# Patient Record
Sex: MALE | Race: BLACK OR AFRICAN AMERICAN | NOT HISPANIC OR LATINO | Employment: UNEMPLOYED | URBAN - NONMETROPOLITAN AREA
[De-identification: names, ages, dates, MRNs, and addresses within clinical notes are randomized per-mention and may not be internally consistent; named-entity substitution may affect disease eponyms.]

---

## 2021-07-27 ENCOUNTER — OFFICE VISIT (OUTPATIENT)
Dept: FAMILY MEDICINE | Facility: CLINIC | Age: 71
End: 2021-07-27
Payer: MEDICARE

## 2021-07-27 VITALS
HEIGHT: 71 IN | WEIGHT: 204 LBS | RESPIRATION RATE: 20 BRPM | DIASTOLIC BLOOD PRESSURE: 72 MMHG | TEMPERATURE: 98 F | BODY MASS INDEX: 28.56 KG/M2 | HEART RATE: 52 BPM | SYSTOLIC BLOOD PRESSURE: 136 MMHG | OXYGEN SATURATION: 98 %

## 2021-07-27 DIAGNOSIS — M54.50 LOW BACK PAIN WITHOUT SCIATICA, UNSPECIFIED BACK PAIN LATERALITY, UNSPECIFIED CHRONICITY: Primary | ICD-10-CM

## 2021-07-27 LAB
BILIRUB SERPL-MCNC: NEGATIVE MG/DL
BLOOD URINE, POC: NEGATIVE
COLOR, POC UA: YELLOW
GLUCOSE UR QL STRIP: NEGATIVE
KETONES UR QL STRIP: NEGATIVE
LEUKOCYTE ESTERASE URINE, POC: NEGATIVE
NITRITE, POC UA: NEGATIVE
PH, POC UA: 5
PROTEIN, POC: NEGATIVE
SPECIFIC GRAVITY, POC UA: >=1.03
UROBILINOGEN, POC UA: 0.2

## 2021-07-27 PROCEDURE — 96372 PR INJECTION,THERAP/PROPH/DIAG2ST, IM OR SUBCUT: ICD-10-PCS | Mod: ,,, | Performed by: NURSE PRACTITIONER

## 2021-07-27 PROCEDURE — 96372 THER/PROPH/DIAG INJ SC/IM: CPT | Mod: ,,, | Performed by: NURSE PRACTITIONER

## 2021-07-27 PROCEDURE — 81003 URINALYSIS AUTO W/O SCOPE: CPT | Mod: RHCUB | Performed by: NURSE PRACTITIONER

## 2021-07-27 PROCEDURE — 99203 PR OFFICE/OUTPT VISIT, NEW, LEVL III, 30-44 MIN: ICD-10-PCS | Mod: 25,,, | Performed by: NURSE PRACTITIONER

## 2021-07-27 PROCEDURE — 99203 OFFICE O/P NEW LOW 30 MIN: CPT | Mod: 25,,, | Performed by: NURSE PRACTITIONER

## 2021-07-27 RX ORDER — TIZANIDINE 4 MG/1
4 TABLET ORAL EVERY 6 HOURS PRN
Qty: 30 TABLET | Refills: 0 | Status: SHIPPED | OUTPATIENT
Start: 2021-07-27 | End: 2021-08-06

## 2021-07-27 RX ORDER — KETOROLAC TROMETHAMINE 30 MG/ML
30 INJECTION, SOLUTION INTRAMUSCULAR; INTRAVENOUS
Status: COMPLETED | OUTPATIENT
Start: 2021-07-27 | End: 2021-07-27

## 2021-07-27 RX ADMIN — KETOROLAC TROMETHAMINE 30 MG: 30 INJECTION, SOLUTION INTRAMUSCULAR; INTRAVENOUS at 02:07

## 2023-11-17 ENCOUNTER — HOSPITAL ENCOUNTER (OUTPATIENT)
Dept: RADIOLOGY | Facility: HOSPITAL | Age: 73
Discharge: HOME | End: 2023-11-17
Attending: FAMILY MEDICINE
Payer: MEDICARE

## 2023-11-17 ENCOUNTER — OFFICE VISIT (OUTPATIENT)
Dept: FAMILY MEDICINE | Facility: CLINIC | Age: 73
End: 2023-11-17
Payer: MEDICARE

## 2023-11-17 VITALS
SYSTOLIC BLOOD PRESSURE: 145 MMHG | TEMPERATURE: 99 F | HEIGHT: 71 IN | OXYGEN SATURATION: 96 % | BODY MASS INDEX: 28.56 KG/M2 | HEART RATE: 62 BPM | WEIGHT: 204 LBS | RESPIRATION RATE: 18 BRPM | DIASTOLIC BLOOD PRESSURE: 74 MMHG

## 2023-11-17 DIAGNOSIS — R05.2 SUBACUTE COUGH: ICD-10-CM

## 2023-11-17 DIAGNOSIS — J01.90 ACUTE NON-RECURRENT SINUSITIS, UNSPECIFIED LOCATION: Primary | ICD-10-CM

## 2023-11-17 PROCEDURE — 99213 OFFICE O/P EST LOW 20 MIN: CPT | Mod: ,,, | Performed by: FAMILY MEDICINE

## 2023-11-17 PROCEDURE — 96372 PR INJECTION,THERAP/PROPH/DIAG2ST, IM OR SUBCUT: ICD-10-PCS | Mod: ,,, | Performed by: FAMILY MEDICINE

## 2023-11-17 PROCEDURE — 96372 THER/PROPH/DIAG INJ SC/IM: CPT | Mod: ,,, | Performed by: FAMILY MEDICINE

## 2023-11-17 PROCEDURE — 71046 X-RAY EXAM CHEST 2 VIEWS: CPT | Mod: TC,PN

## 2023-11-17 PROCEDURE — 99213 PR OFFICE/OUTPT VISIT, EST, LEVL III, 20-29 MIN: ICD-10-PCS | Mod: ,,, | Performed by: FAMILY MEDICINE

## 2023-11-17 RX ORDER — RISPERIDONE 2 MG/1
1 TABLET ORAL NIGHTLY
COMMUNITY
Start: 2023-05-16

## 2023-11-17 RX ORDER — FAMOTIDINE 20 MG/1
20 TABLET, FILM COATED ORAL 2 TIMES DAILY
COMMUNITY
Start: 2023-03-30

## 2023-11-17 RX ORDER — FLUTICASONE PROPIONATE AND SALMETEROL 250; 50 UG/1; UG/1
1 POWDER RESPIRATORY (INHALATION) 2 TIMES DAILY PRN
COMMUNITY
Start: 2023-01-30

## 2023-11-17 RX ORDER — GUAIFENESIN 600 MG/1
600 TABLET, EXTENDED RELEASE ORAL 2 TIMES DAILY
COMMUNITY
Start: 2023-08-17

## 2023-11-17 RX ORDER — CEFUROXIME AXETIL 500 MG/1
500 TABLET ORAL 2 TIMES DAILY
Qty: 14 TABLET | Refills: 0 | Status: SHIPPED | OUTPATIENT
Start: 2023-11-17 | End: 2023-11-27

## 2023-11-17 RX ORDER — DEXAMETHASONE SODIUM PHOSPHATE 4 MG/ML
4 INJECTION, SOLUTION INTRA-ARTICULAR; INTRALESIONAL; INTRAMUSCULAR; INTRAVENOUS; SOFT TISSUE
Status: COMPLETED | OUTPATIENT
Start: 2023-11-17 | End: 2023-11-17

## 2023-11-17 RX ORDER — TAMSULOSIN HYDROCHLORIDE 0.4 MG/1
0.4 CAPSULE ORAL DAILY
COMMUNITY
Start: 2023-06-14

## 2023-11-17 RX ORDER — DONEPEZIL HYDROCHLORIDE 5 MG/1
5 TABLET, FILM COATED ORAL DAILY
COMMUNITY
Start: 2023-05-16

## 2023-11-17 RX ORDER — LORATADINE 10 MG/1
10 TABLET ORAL DAILY
COMMUNITY
Start: 2023-05-15

## 2023-11-17 RX ORDER — HYDROXYZINE HYDROCHLORIDE 25 MG/1
25 TABLET, FILM COATED ORAL 2 TIMES DAILY PRN
COMMUNITY
Start: 2023-05-15

## 2023-11-17 RX ORDER — METHYLPREDNISOLONE ACETATE 40 MG/ML
40 INJECTION, SUSPENSION INTRA-ARTICULAR; INTRALESIONAL; INTRAMUSCULAR; SOFT TISSUE
Status: COMPLETED | OUTPATIENT
Start: 2023-11-17 | End: 2023-11-17

## 2023-11-17 RX ADMIN — DEXAMETHASONE SODIUM PHOSPHATE 4 MG: 4 INJECTION, SOLUTION INTRA-ARTICULAR; INTRALESIONAL; INTRAMUSCULAR; INTRAVENOUS; SOFT TISSUE at 09:11

## 2023-11-17 RX ADMIN — METHYLPREDNISOLONE ACETATE 40 MG: 40 INJECTION, SUSPENSION INTRA-ARTICULAR; INTRALESIONAL; INTRAMUSCULAR; SOFT TISSUE at 09:11

## 2023-11-17 NOTE — PROGRESS NOTES
New Clinic Note    Sheldon Hopkins is a 72 y.o. male     CC:   Chief Complaint   Patient presents with    Shortness of Breath    Cough    Generalized Body Aches    Establish Care     New patient that has been 2 years since he was seen in our clinic. [facility] He stopped smoking back in 2020 and had abnormal CT scan then. He complains of SOB, productive cough for past one month and has low grade temperature. His blood pressure is elevated. Stated cough is worse at night. Has history of GERD. Did not bring his medications in for review.     Gastroesophageal Reflux     Stated his cough is worse at night. Feels like something in his throat.        Subjective    History of Present Illness   Patient complains of wheezing, shortness of breath, and productive cough for a month. Patient also complains of facial pressure, body aches, and headaches. Patient has not tried OTC medications.    Current Outpatient Medications:     donepeziL (ARICEPT) 5 MG tablet, Take 5 mg by mouth once daily., Disp: , Rfl:     famotidine (PEPCID) 20 MG tablet, Take 20 mg by mouth 2 (two) times daily., Disp: , Rfl:     fluticasone-salmeterol diskus inhaler 250-50 mcg, Inhale 1 puff into the lungs 2 (two) times daily as needed., Disp: , Rfl:     hydrOXYzine HCL (ATARAX) 25 MG tablet, Take 25 mg by mouth 2 (two) times daily as needed., Disp: , Rfl:     loratadine (CLARITIN) 10 mg tablet, Take 10 mg by mouth once daily., Disp: , Rfl:     risperiDONE (RISPERDAL) 2 MG tablet, Take 1 mg by mouth every evening., Disp: , Rfl:     tamsulosin (FLOMAX) 0.4 mg Cap, Take 0.4 mg by mouth once daily., Disp: , Rfl:     cefUROXime (CEFTIN) 500 MG tablet, Take 1 tablet (500 mg total) by mouth 2 (two) times daily., Disp: 14 tablet, Rfl: 0    guaiFENesin (MUCINEX) 600 mg 12 hr tablet, Take 600 mg by mouth 2 (two) times daily., Disp: , Rfl:   No current facility-administered medications for this visit.     History reviewed. No pertinent  "past medical history.     History reviewed. No pertinent family history.     History reviewed. No pertinent surgical history.     Social History     Socioeconomic History    Marital status:    Tobacco Use    Smoking status: Former     Average packs/day: 0.9 packs/day for 54.0 years (51.0 ttl pk-yrs)     Types: Cigarettes     Start date: 11/29/1969     Passive exposure: Past    Smokeless tobacco: Never    Tobacco comments:     Quit smoking in 2020   Substance and Sexual Activity    Alcohol use: Not Currently    Drug use: Not Currently    Sexual activity: Not Currently        Review of Systems   Constitutional:  Negative for fatigue and fever.   HENT:  Positive for postnasal drip, rhinorrhea and sinus pressure/congestion. Negative for ear pain.    Respiratory:  Positive for cough and shortness of breath.    Cardiovascular:  Negative for chest pain.   Gastrointestinal:  Negative for abdominal pain, diarrhea, nausea and vomiting.   Genitourinary:  Negative for dysuria.   Neurological:  Positive for headaches.        BP (!) 145/74 (BP Location: Left arm, Patient Position: Sitting, BP Method: Large (Automatic))   Pulse 62   Temp 99.4 °F (37.4 °C) (Oral)   Resp 18   Ht 5' 11" (1.803 m)   Wt 92.5 kg (204 lb)   SpO2 96%   BMI 28.45 kg/m²      Physical Exam  HENT:      Head: Normocephalic and atraumatic.      Nose: Rhinorrhea present.   Cardiovascular:      Rate and Rhythm: Normal rate and regular rhythm.   Pulmonary:      Effort: Pulmonary effort is normal.      Breath sounds: Normal breath sounds.   Neurological:      Mental Status: He is alert and oriented to person, place, and time.   Psychiatric:         Mood and Affect: Mood normal.         Behavior: Behavior normal.          Assessment and Plan      ICD-10-CM ICD-9-CM   1. Acute non-recurrent sinusitis, unspecified location  J01.90 461.9   2. Subacute cough  R05.2 786.2        1. Acute non-recurrent sinusitis, unspecified location  -     cefUROXime " (CEFTIN) 500 MG tablet; Take 1 tablet (500 mg total) by mouth 2 (two) times daily.  Dispense: 14 tablet; Refill: 0  -     methylPREDNISolone acetate injection 40 mg  -     dexAMETHasone injection 4 mg    2. Subacute cough  -     X-Ray Chest PA And Lateral; Future; Expected date: 11/17/2023         Patient Instructions   Take medications as directed  Monitor temperature, if fever begins, tylenol or ibuprofen can be used as long as you have no history of abnormal reactions to these medications. Follow the instructions on the bottle or contact clinic with questions.   Please contact clinic if symptoms begin to get worse.   Report to the ER if you feel your symptoms are severe and life threatening.       Follow up if symptoms worsen or fail to improve.

## 2023-11-27 ENCOUNTER — OFFICE VISIT (OUTPATIENT)
Dept: FAMILY MEDICINE | Facility: CLINIC | Age: 73
End: 2023-11-27
Payer: MEDICARE

## 2023-11-27 VITALS
HEIGHT: 71 IN | TEMPERATURE: 98 F | OXYGEN SATURATION: 96 % | BODY MASS INDEX: 28.56 KG/M2 | DIASTOLIC BLOOD PRESSURE: 80 MMHG | SYSTOLIC BLOOD PRESSURE: 148 MMHG | HEART RATE: 55 BPM | WEIGHT: 204 LBS

## 2023-11-27 DIAGNOSIS — M54.9 MUSCULOSKELETAL BACK PAIN: Primary | ICD-10-CM

## 2023-11-27 DIAGNOSIS — M46.1 SI (SACROILIAC) JOINT INFLAMMATION: ICD-10-CM

## 2023-11-27 PROBLEM — N13.8 BENIGN PROSTATIC HYPERPLASIA WITH URINARY OBSTRUCTION: Status: ACTIVE | Noted: 2023-11-27

## 2023-11-27 PROBLEM — M19.90 OSTEOARTHRITIS: Status: ACTIVE | Noted: 2023-11-27

## 2023-11-27 PROBLEM — F32.9 MAJOR DEPRESSIVE DISORDER: Status: ACTIVE | Noted: 2023-11-27

## 2023-11-27 PROBLEM — F43.12 CHRONIC POST-TRAUMATIC STRESS DISORDER: Status: ACTIVE | Noted: 2023-11-27

## 2023-11-27 PROBLEM — R73.03 PREDIABETES: Status: ACTIVE | Noted: 2023-11-27

## 2023-11-27 PROBLEM — R91.1 SOLITARY PULMONARY NODULE: Status: ACTIVE | Noted: 2023-11-27

## 2023-11-27 PROBLEM — J30.9 ALLERGIC RHINITIS: Status: ACTIVE | Noted: 2023-11-27

## 2023-11-27 PROBLEM — F25.9 SCHIZOAFFECTIVE DISORDER: Status: ACTIVE | Noted: 2023-11-27

## 2023-11-27 PROBLEM — N40.1 BENIGN PROSTATIC HYPERPLASIA WITH URINARY OBSTRUCTION: Status: ACTIVE | Noted: 2023-11-27

## 2023-11-27 PROBLEM — K21.9 GASTRO-ESOPHAGEAL REFLUX DISEASE WITHOUT ESOPHAGITIS: Status: ACTIVE | Noted: 2023-11-27

## 2023-11-27 LAB
BILIRUB SERPL-MCNC: NEGATIVE MG/DL
BLOOD URINE, POC: NEGATIVE
COLOR, POC UA: YELLOW
GLUCOSE UR QL STRIP: NEGATIVE
KETONES UR QL STRIP: NEGATIVE
LEUKOCYTE ESTERASE URINE, POC: NEGATIVE
NITRITE, POC UA: NEGATIVE
PH, POC UA: 5
PROTEIN, POC: NEGATIVE
SPECIFIC GRAVITY, POC UA: 1.02
UROBILINOGEN, POC UA: 0.2

## 2023-11-27 PROCEDURE — 96372 PR INJECTION,THERAP/PROPH/DIAG2ST, IM OR SUBCUT: ICD-10-PCS | Mod: ,,, | Performed by: NURSE PRACTITIONER

## 2023-11-27 PROCEDURE — 81003 URINALYSIS AUTO W/O SCOPE: CPT | Mod: RHCUB | Performed by: NURSE PRACTITIONER

## 2023-11-27 PROCEDURE — 99213 OFFICE O/P EST LOW 20 MIN: CPT | Mod: ,,, | Performed by: NURSE PRACTITIONER

## 2023-11-27 PROCEDURE — 99213 PR OFFICE/OUTPT VISIT, EST, LEVL III, 20-29 MIN: ICD-10-PCS | Mod: ,,, | Performed by: NURSE PRACTITIONER

## 2023-11-27 PROCEDURE — 96372 THER/PROPH/DIAG INJ SC/IM: CPT | Mod: ,,, | Performed by: NURSE PRACTITIONER

## 2023-11-27 RX ORDER — TIZANIDINE 4 MG/1
4 TABLET ORAL NIGHTLY
Qty: 5 TABLET | Refills: 0 | Status: SHIPPED | OUTPATIENT
Start: 2023-11-27 | End: 2023-12-02

## 2023-11-27 RX ORDER — TIZANIDINE 4 MG/1
4 TABLET ORAL NIGHTLY
Qty: 5 TABLET | Refills: 0 | Status: SHIPPED | OUTPATIENT
Start: 2023-11-27 | End: 2023-11-27 | Stop reason: SDUPTHER

## 2023-11-27 RX ORDER — KETOROLAC TROMETHAMINE 30 MG/ML
30 INJECTION, SOLUTION INTRAMUSCULAR; INTRAVENOUS
Status: COMPLETED | OUTPATIENT
Start: 2023-11-27 | End: 2023-11-27

## 2023-11-27 RX ADMIN — KETOROLAC TROMETHAMINE 30 MG: 30 INJECTION, SOLUTION INTRAMUSCULAR; INTRAVENOUS at 09:11

## 2023-11-27 NOTE — PROGRESS NOTES
WESLEY Nash    [address] [place] [address]     PATIENT NAME: Sheldon Hopkins  : 1950  DATE: 23  MRN: [id number]      Billing Provider: WESLEY Nash  Level of Service: [place] OFFICE/OUTPT VISIT, EST, LEVL III, 20-29 MIN    ASSESSMENT AND PLAN:      Problem List Items Addressed This Visit    None  Visit Diagnoses       Musculoskeletal back pain    -  Primary    Relevant Medications    ketorolac injection 30 mg (Start on 2023  9:30 AM)    tiZANidine (ZANAFLEX) 4 MG tablet    Other Relevant Orders    POCT URINALYSIS W/O SCOPE (Completed)    SI (sacroiliac) joint inflammation        Relevant Medications    tiZANidine (ZANAFLEX) 4 MG tablet        Toradol 30 mg IM today  Tizanidine 4 mg at bedtime for 5 nights for back pain  Begin exercises demonstrated in clinic today: rock hips side to side for one minute/ roll hips each direction for one minute each/ rock hips forward and backward for one minute  Ice after exercise for 5 minutes  Expect two days til improvement  UA normal findings    The patient has no Health Maintenance topics of status Not Due  No future appointments.   No follow-ups on file. or sooner as needed.      CHIEF COMPLAINT: Back Pain (Patient states on yesterday he bent over to get clothes out the dryer and felt a pain when lifting up. State its hard for him move and he really can't locate where the pain is coming from. He state its not constant pain it just when he move he feel the pain. Without moving pain 0/10 with moving 10/10.)           HISTORY OF PRESENT ILLNESS:  Sheldon Hopkins is a 72 y.o. male who presents to the clinic today     Sheldon Hopkins presents to the clinic with Back Pain (Patient states on yesterday he bent over to get clothes out the dryer and felt a pain when lifting up. State its hard for him move and he really can't locate where the pain is coming from. He state its not constant pain it just  "when he move he feel the pain. Without moving pain 0/10 with moving 10/10.)     Reports leaning forward with twist to torso to remove clothes from dryer and pain started upon rising up     Back Pain  This is a new problem. The current episode started yesterday. The problem occurs intermittently. The problem has been waxing and waning since onset. The pain is present in the sacro-iliac. The quality of the pain is described as shooting and cramping ("feels like a catch and I can't move my leg or back"). The pain does not radiate. The pain is at a severity of 7/10. The pain is moderate. The symptoms are aggravated by bending, position and twisting. Pertinent negatives include no bladder incontinence, bowel incontinence, leg pain, numbness, paresis, paresthesias or weakness. Risk factors include lack of exercise and sedentary lifestyle. He has tried nothing for the symptoms. The treatment provided no relief.       PAST MEDICAL HISTORY:      History reviewed. No pertinent past medical history.  PAST SURGICAL HISTORY:  History reviewed. No pertinent surgical history.  SOCIAL HISTORY:  Social History     Socioeconomic History    Marital status:    Tobacco Use    Smoking status: Former     Average packs/day: 0.9 packs/day for 54.0 years (51.0 ttl pk-yrs)     Types: Cigarettes     Start date: 11/29/1969     Passive exposure: Past    Smokeless tobacco: Never    Tobacco comments:     Quit smoking in 2020   Substance and Sexual Activity    Alcohol use: Not Currently    Drug use: Not Currently    Sexual activity: Not Currently     FAMILY HISTORY:       History reviewed. No pertinent family history.    ALLERGIES AND MEDICATIONS: updated and reviewed.       Review of patient's allergies indicates:  No Known Allergies  Medication List with Changes/Refills   New Medications    TIZANIDINE (ZANAFLEX) 4 MG TABLET    Take 1 tablet (4 mg total) by mouth every evening. for 5 days   Current Medications    CEFUROXIME (CEFTIN) 500 MG " TABLET    Take 1 tablet (500 mg total) by mouth 2 (two) times daily.    DONEPEZIL (ARICEPT) 5 MG TABLET    Take 5 mg by mouth once daily.    FAMOTIDINE (PEPCID) 20 MG TABLET    Take 20 mg by mouth 2 (two) times daily.    FLUTICASONE-SALMETEROL DISKUS INHALER 250-50 MCG    Inhale 1 puff into the lungs 2 (two) times daily as needed.    GUAIFENESIN (MUCINEX) 600 MG 12 HR TABLET    Take 600 mg by mouth 2 (two) times daily.    HYDROXYZINE HCL (ATARAX) 25 MG TABLET    Take 25 mg by mouth 2 (two) times daily as needed.    LORATADINE (CLARITIN) 10 MG TABLET    Take 10 mg by mouth once daily.    RISPERIDONE (RISPERDAL) 2 MG TABLET    Take 1 mg by mouth every evening.    TAMSULOSIN (FLOMAX) 0.4 MG CAP    Take 0.4 mg by mouth once daily.       SCREENING HISTORY:     Health Maintenance         Date Due Completion Date    Hepatitis C Screening Never done ---    Hemoglobin A1c (Prediabetes) Never done ---    Lipid Panel Never done ---    Colorectal Cancer Screening Never done ---    LDCT Lung Screen Never done ---    Shingles Vaccine (1 of 2) Never done ---    RSV Vaccine (Age 60+ and Pregnant patients) (1 - 1-dose 60+ series) Never done ---    Abdominal Aortic Aneurysm Screening Never done ---    TETANUS VACCINE 06/18/2018 6/18/2008    Pneumococcal Vaccines (Age 65+) (2 - PPSV23 or PCV20) 04/30/2020 4/30/2019    Influenza Vaccine (1) 09/01/2023 1/1/2023    COVID-19 Vaccine (4 - 2023-24 season) 09/01/2023 12/3/2021            REVIEW OF SYSTEMS:   Review of Systems   Constitutional: Negative.    Respiratory: Negative.     Gastrointestinal:  Negative for bowel incontinence.   Genitourinary:  Negative for bladder incontinence.   Musculoskeletal:  Positive for back pain. Negative for leg pain.   Neurological:  Negative for weakness, numbness and paresthesias.         PHYSICAL EXAM:         BP (!) 148/80 (BP Location: Left arm, Patient Position: Sitting, BP Method: Large (Automatic))   Pulse (!) 55   Temp 97.8 °F (36.6 °C) (Oral)    "Ht 5' 11" (1.803 m)   Wt 92.5 kg (204 lb)   SpO2 96%   BMI 28.45 kg/m²  No LMP for male patient.  Wt Readings from Last 3 Encounters:   11/27/23 92.5 kg (204 lb)   11/17/23 92.5 kg (204 lb)   07/27/21 92.5 kg (204 lb)     BP Readings from Last 3 Encounters:   11/27/23 (!) 148/80   11/17/23 (!) 145/74   07/27/21 136/72     Estimated body mass index is 28.45 kg/m² as calculated from the following:    Height as of this encounter: 5' 11" (1.803 m).    Weight as of this encounter: 92.5 kg (204 lb).     Physical Exam  Cardiovascular:      Rate and Rhythm: Normal rate and regular rhythm.      Pulses: Normal pulses.   Pulmonary:      Effort: Pulmonary effort is normal.      Breath sounds: Normal breath sounds.   Musculoskeletal:      Cervical back: Neck supple.   Neurological:      Mental Status: He is alert.   Psychiatric:         Mood and Affect: Affect is labile and flat.         Speech: Speech normal.         Behavior: Behavior is cooperative.         LABS:     I have reviewed old labs below:  No results found for: "WBC", "HGB", "HCT", "MCV", "PLT", "NA", "K", "CL", "CALCIUM", "PHOS", "CO2", "GLU", "BUN", "CREATININE", "ANIONGAP", "ESTGFRAFRICA", "EGFRNONAA", "PROT", "ALBUMIN", "BILITOT", "ALKPHOS", "ALT", "AST", "INR", "CHOL", "TRIG", "HDL", "LDLCALC", "TSH", "PSA", "GLUF", "HGBA1C", "MICROALBUR"        Signature:  CHRIS Nash[address] [place] [address]  Phone #: [phone]  Fax #: [phone]       Date of encounter: 11/27/23    Patient Instructions   Toradol 30 mg IM today  Tizanidine 4 mg at bedtime for 5 nights for back pain  Begin exercises demonstrated in clinic today: rock hips side to side for one minute/ roll hips each direction for one minute each/ rock hips forward and backward for one minute  Ice after exercise for 5 minutes  Expect two days til improvement       "

## 2023-11-27 NOTE — PATIENT INSTRUCTIONS
Toradol 30 mg IM today  Tizanidine 4 mg at bedtime for 5 nights for back pain  Begin exercises demonstrated in clinic today: rock hips side to side for one minute/ roll hips each direction for one minute each/ rock hips forward and backward for one minute  Ice after exercise for 5 minutes  Expect two days til improvement

## 2024-08-20 ENCOUNTER — OFFICE VISIT (OUTPATIENT)
Dept: FAMILY MEDICINE | Facility: CLINIC | Age: 74
End: 2024-08-20
Payer: MEDICARE

## 2024-08-20 VITALS
RESPIRATION RATE: 16 BRPM | SYSTOLIC BLOOD PRESSURE: 137 MMHG | HEART RATE: 60 BPM | WEIGHT: 201.19 LBS | DIASTOLIC BLOOD PRESSURE: 65 MMHG | OXYGEN SATURATION: 96 % | HEIGHT: 71 IN | BODY MASS INDEX: 28.17 KG/M2 | TEMPERATURE: 98 F

## 2024-08-20 DIAGNOSIS — S39.012A BACK STRAIN, INITIAL ENCOUNTER: Primary | ICD-10-CM

## 2024-08-20 PROCEDURE — 99213 OFFICE O/P EST LOW 20 MIN: CPT | Mod: ,,, | Performed by: FAMILY MEDICINE

## 2024-08-20 PROCEDURE — 96372 THER/PROPH/DIAG INJ SC/IM: CPT | Mod: ,,, | Performed by: FAMILY MEDICINE

## 2024-08-20 RX ORDER — DEXAMETHASONE SODIUM PHOSPHATE 4 MG/ML
4 INJECTION, SOLUTION INTRA-ARTICULAR; INTRALESIONAL; INTRAMUSCULAR; INTRAVENOUS; SOFT TISSUE
Status: COMPLETED | OUTPATIENT
Start: 2024-08-20 | End: 2024-08-20

## 2024-08-20 RX ORDER — KETOROLAC TROMETHAMINE 30 MG/ML
30 INJECTION, SOLUTION INTRAMUSCULAR; INTRAVENOUS
Status: COMPLETED | OUTPATIENT
Start: 2024-08-20 | End: 2024-08-20

## 2024-08-20 RX ORDER — MELOXICAM 7.5 MG/1
7.5 TABLET ORAL DAILY PRN
Qty: 30 TABLET | Refills: 0 | Status: SHIPPED | OUTPATIENT
Start: 2024-08-20

## 2024-08-20 RX ORDER — CYCLOBENZAPRINE HCL 5 MG
5 TABLET ORAL 3 TIMES DAILY PRN
Qty: 30 TABLET | Refills: 0 | Status: SHIPPED | OUTPATIENT
Start: 2024-08-20 | End: 2024-08-30

## 2024-08-20 RX ADMIN — DEXAMETHASONE SODIUM PHOSPHATE 4 MG: 4 INJECTION, SOLUTION INTRA-ARTICULAR; INTRALESIONAL; INTRAMUSCULAR; INTRAVENOUS; SOFT TISSUE at 10:08

## 2024-08-20 RX ADMIN — KETOROLAC TROMETHAMINE 30 MG: 30 INJECTION, SOLUTION INTRAMUSCULAR; INTRAVENOUS at 10:08

## 2024-08-20 NOTE — PROGRESS NOTES
Dino Warner MD    [address] [place] [address]     PATIENT NAME: Sheldon Hopkins  : 1950  DATE: 24  MRN: [id number]      Billing Provider: Dino Warner MD  Level of Service: [place] OFFICE/OUTPT VISIT, EST, LEVL III, 20-29 MIN  Patient PCP Information       Provider PCP Type    Primary Doctor No General            Reason for Visit / Chief Complaint: Back Pain (States he bend over yesterday and his back started hurting. He states it hurts when he moves but when he is sitting still he is not in any pain.)       Update PCP  Update Chief Complaint         History of Present Illness / Problem Focused Workflow     Sheldon Hopkins presents to the clinic with Back Pain (States he bend over yesterday and his back started hurting. He states it hurts when he moves but when he is sitting still he is not in any pain.)     Pain started yesterday, is back started hurting after bending over, it is some better now especially when he is sitting still     HPI    Review of Systems     Review of Systems   Constitutional:  Negative for activity change, appetite change, chills, fatigue and fever.   HENT:  Negative for nasal congestion, ear pain, hearing loss, postnasal drip and sore throat.    Respiratory:  Negative for cough, chest tightness, shortness of breath and wheezing.    Cardiovascular:  Negative for chest pain, palpitations, leg swelling and claudication.   Gastrointestinal:  Negative for abdominal pain, change in bowel habit, constipation, diarrhea, nausea and vomiting.   Genitourinary:  Negative for dysuria.   Musculoskeletal:  Positive for back pain. Negative for arthralgias, gait problem and myalgias.   Integumentary:  Negative for rash.   Neurological:  Positive for memory loss. Negative for weakness and headaches.   Psychiatric/Behavioral:  Negative for suicidal ideas. The patient is not nervous/anxious.         Medical / Social / Family History    History reviewed. No pertinent past medical history.    History reviewed. No pertinent surgical history.    Social History    reports that he has quit smoking. His smoking use included cigarettes. He started smoking about 54 years ago. He has a 51 pack-year smoking history. He has been exposed to tobacco smoke. He has never used smokeless tobacco. He reports that he does not currently use alcohol. He reports that he does not currently use drugs.    Family History  's family history includes No Known Problems in his father and mother.    Medications and Allergies     Medications  No outpatient medications have been marked as taking for the 8/20/24 encounter (Office Visit) with Dino Warner MD.     Current Facility-Administered Medications for the 8/20/24 encounter (Office Visit) with Dino Warner MD   Medication Dose Route Frequency Provider Last Rate Last Admin    [COMPLETED] dexAMETHasone injection 4 mg  4 mg Intramuscular 1 time in Clinic/HOD Dino Warner MD   4 mg at 08/20/24 1025    [COMPLETED] ketorolac injection 30 mg  30 mg Intramuscular 1 time in Clinic/HOD Dino Warner MD   30 mg at 08/20/24 1025       Allergies  Review of patient's allergies indicates:  No Known Allergies    Physical Examination     Vitals:    08/20/24 0912   BP: 137/65   Pulse: 60   Resp: 16   Temp: 97.6 °F (36.4 °C)     Physical Exam  Vitals and nursing note reviewed.   Constitutional:       General: He is not in acute distress.     Appearance: Normal appearance. He is not ill-appearing.   Eyes:      Extraocular Movements: Extraocular movements intact.      Pupils: Pupils are equal, round, and reactive to light.   Cardiovascular:      Rate and Rhythm: Normal rate and regular rhythm.   Pulmonary:      Effort: Pulmonary effort is normal.      Breath sounds: Normal breath sounds.   Musculoskeletal:      Lumbar back: Tenderness present.        Back:    Skin:     General: Skin is warm.      Findings: No  rash.   Neurological:      General: No focal deficit present.      Mental Status: He is alert and oriented to person, place, and time. Mental status is at baseline.   Psychiatric:         Mood and Affect: Mood normal.         Behavior: Behavior normal.            Assessment and Plan (including Health Maintenance)      Problem List  Smart Sets  Document Outside HM   :    Plan:     Anti-inflammation, back stretches, and time. [facility]    Health Maintenance Due   Topic Date Due    Hepatitis C Screening  Never done    Hemoglobin A1c (Prediabetes)  Never done    Lipid Panel  Never done    Colorectal Cancer Screening  Never done    Shingles Vaccine (1 of 2) Never done    RSV Vaccine (Age 60+ and Pregnant patients) (1 - 1-dose 60+ series) Never done    Abdominal Aortic Aneurysm Screening  Never done    TETANUS VACCINE  06/18/2018    Pneumococcal Vaccines (Age 65+) (2 of 2 - PPSV23 or PCV20) 04/30/2020    COVID-19 Vaccine (4 - 2023-24 season) 09/01/2023       Problem List Items Addressed This Visit    None  Visit Diagnoses       Back strain, initial encounter    -  Primary    Relevant Medications    cyclobenzaprine (FLEXERIL) 5 MG tablet    meloxicam (MOBIC) 7.5 MG tablet    ketorolac injection 30 mg (Completed)    dexAMETHasone injection 4 mg (Completed)            Health Maintenance Topics with due status: Not Due       Topic Last Completion Date    Influenza Vaccine 01/01/2023       Procedures     No future appointments.       Follow up if symptoms worsen or fail to improve.     Signature:  Dino Warner MD  [address] [place] [address]  Phone #: [phone]  Fax #: [phone]    Date of encounter: 8/20/24    There are no Patient Instructions on file for this visit.